# Patient Record
Sex: FEMALE | Race: WHITE | Employment: OTHER | URBAN - METROPOLITAN AREA
[De-identification: names, ages, dates, MRNs, and addresses within clinical notes are randomized per-mention and may not be internally consistent; named-entity substitution may affect disease eponyms.]

---

## 2019-06-10 ENCOUNTER — TRANSFERRED RECORDS (OUTPATIENT)
Dept: HEALTH INFORMATION MANAGEMENT | Facility: CLINIC | Age: 69
End: 2019-06-10

## 2019-11-20 ENCOUNTER — OFFICE VISIT (OUTPATIENT)
Dept: OPHTHALMOLOGY | Facility: CLINIC | Age: 69
End: 2019-11-20
Payer: COMMERCIAL

## 2019-11-20 DIAGNOSIS — H02.834 DERMATOCHALASIS OF BOTH UPPER EYELIDS: Primary | ICD-10-CM

## 2019-11-20 DIAGNOSIS — H57.813 BROW PTOSIS, BILATERAL: ICD-10-CM

## 2019-11-20 DIAGNOSIS — H02.831 DERMATOCHALASIS OF BOTH UPPER EYELIDS: Primary | ICD-10-CM

## 2019-11-20 PROBLEM — Z12.11 SCREEN FOR COLON CANCER: Status: ACTIVE | Noted: 2017-09-15

## 2019-11-20 PROBLEM — K21.9 GERD (GASTROESOPHAGEAL REFLUX DISEASE): Status: ACTIVE | Noted: 2017-09-15

## 2019-11-20 PROCEDURE — 92081 LIMITED VISUAL FIELD XM: CPT | Performed by: OPHTHALMOLOGY

## 2019-11-20 PROCEDURE — 99203 OFFICE O/P NEW LOW 30 MIN: CPT | Performed by: OPHTHALMOLOGY

## 2019-11-20 PROCEDURE — 92285 EXTERNAL OCULAR PHOTOGRAPHY: CPT | Performed by: OPHTHALMOLOGY

## 2019-11-20 RX ORDER — NITROFURANTOIN 25; 75 MG/1; MG/1
CAPSULE ORAL
Refills: 0 | COMMUNITY
Start: 2018-11-27

## 2019-11-20 RX ORDER — TRIAMCINOLONE ACETONIDE 1 MG/G
CREAM TOPICAL
Refills: 1 | COMMUNITY
Start: 2019-05-29

## 2019-11-20 RX ORDER — HYDROCODONE BITARTRATE AND ACETAMINOPHEN 5; 325 MG/1; MG/1
1-2 TABLET ORAL
COMMUNITY
Start: 2012-01-28

## 2019-11-20 RX ORDER — LOSARTAN POTASSIUM 25 MG/1
25 TABLET ORAL
COMMUNITY

## 2019-11-20 RX ORDER — ALENDRONATE SODIUM 35 MG/1
TABLET ORAL
Refills: 3 | COMMUNITY
Start: 2019-08-18

## 2019-11-20 RX ORDER — CHLORAL HYDRATE 500 MG
1000 CAPSULE ORAL
COMMUNITY

## 2019-11-20 RX ORDER — ROSUVASTATIN CALCIUM 10 MG/1
TABLET, COATED ORAL
Refills: 3 | COMMUNITY
Start: 2019-08-24

## 2019-11-20 ASSESSMENT — EXTERNAL EXAM - LEFT EYE: OS_EXAM: BROW PTOSIS, WITH FRONTALIS RELAXED, BROW IS BELOW SUPERIOR ORBITAL RIM AND LATERALLY INLINE WITH UPPER LASHES

## 2019-11-20 ASSESSMENT — TONOMETRY
OD_IOP_MMHG: 12
OS_IOP_MMHG: 14
IOP_METHOD: ICARE

## 2019-11-20 ASSESSMENT — VISUAL ACUITY
OD_SC+: -1
OS_SC: 20/20
OD_SC: 20/20
METHOD: SNELLEN - LINEAR

## 2019-11-20 ASSESSMENT — EXTERNAL EXAM - RIGHT EYE: OD_EXAM: BROW PTOSIS, WITH FRONTALIS RELAXED, BROW IS BELOW SUPERIOR ORBITAL RIM AND LATERALLY INLINE WITH UPPER LASHES

## 2019-11-20 ASSESSMENT — SLIT LAMP EXAM - LIDS
COMMENTS: HEAVY DERMATOCHALASIS RESTING ON LASHES
COMMENTS: HEAVY DERMATOCHALASIS RESTING ON LASHES

## 2019-11-20 NOTE — LETTER
2019         RE:  :  MRN: Rosario Anguiano  1950  [id number]     Dear Dr. Dobbs,    Thank you for asking me to see your patient, Rosario Anguiano, for an oculoplastic   consultation.  My assessment and plan are below.  For further details, please see my attached clinic note.           HPI:     Chief Complaint(s) and History of Present Illness(es)     Droopy Eye Lid Evaluation     Laterality: right upper lid and left upper lid    Severity: moderate    Onset: chronic    Duration: years    Course: gradually worsening          Comments     Patient is referred by Dr. Aries Dobbs for eyelid evaluation. Patient   reports using ATs a few times a week for dry eyes.         Rosario Anguiano is a 68 year old female who has noted gradual onset of droopy eyelids over the past years. The droopy eyelid is interfering with activities of daily living including driving, and reading. The patient denies double vision, variability of the eyelid position, or dry eye symptoms.     EXAM:     MRD1: 2 right eye and 1.5 left eye   Dermatochalasis with excess skin touching eyelashes worse on the left due to brow ptosis  Brow ptosis with brow resting below superior orbital rim     VISUAL FIELD:  Right eye untaped:20 degrees Right eye taped:50 degrees  Left eye untaped:24 degrees Left eye taped:50 degrees    Assessment & Plan     Rosario Anguiano is a 68 year old female with the following diagnoses:   1. Dermatochalasis of both upper eyelids    2. Brow ptosis, bilateral         Both upper eyelid blepharoplasty (N&CF) and Internal browpexy both    ANTICOAGULATION:    Supplements (fish Oil, vitamin E): Fishoil    Can hold prior to surgery        Again, thank you for allowing me to participate in the care of your patient.      Sincerely,    Jeb Harding MD  Department of Ophthalmology and Visual Neurosciences  [facility]    CC: Aries Dobbs  [facility]  [address] [facility] [address]  [facility] [address]  VIA  Facsimile: [phone]

## 2019-11-20 NOTE — NURSING NOTE
Chief Complaints and History of Present Illnesses   Patient presents with     Droopy Eye Lid Evaluation       Chief Complaint(s) and History of Present Illness(es)     Droopy Eye Lid Evaluation     Laterality: right upper lid and left upper lid    Severity: moderate    Onset: chronic    Duration: years    Course: gradually worsening              Comments     Patient is referred by Dr. Aries Dobbs for eyelid evaluation. Patient reports using ATs a few times a week for dry eyes.                Melanie Jeans, OA

## 2019-12-26 ENCOUNTER — ANESTHESIA EVENT (OUTPATIENT)
Dept: SURGERY | Facility: AMBULATORY SURGERY CENTER | Age: 69
End: 2019-12-26

## 2019-12-30 ENCOUNTER — SURGERY (OUTPATIENT)
Age: 69
End: 2019-12-30
Payer: COMMERCIAL

## 2019-12-30 ENCOUNTER — ANESTHESIA (OUTPATIENT)
Dept: SURGERY | Facility: AMBULATORY SURGERY CENTER | Age: 69
End: 2019-12-30
Payer: COMMERCIAL

## 2019-12-30 ENCOUNTER — HOSPITAL ENCOUNTER (OUTPATIENT)
Facility: AMBULATORY SURGERY CENTER | Age: 69
Discharge: HOME | End: 2019-12-30
Attending: OPHTHALMOLOGY | Admitting: OPHTHALMOLOGY
Payer: COMMERCIAL

## 2019-12-30 VITALS
TEMPERATURE: 97.4 F | SYSTOLIC BLOOD PRESSURE: 145 MMHG | OXYGEN SATURATION: 98 % | DIASTOLIC BLOOD PRESSURE: 67 MMHG | RESPIRATION RATE: 16 BRPM

## 2019-12-30 DIAGNOSIS — Z98.890 POSTOPERATIVE EYE STATE: Primary | ICD-10-CM

## 2019-12-30 DIAGNOSIS — H02.834 DERMATOCHALASIS OF BOTH UPPER EYELIDS: ICD-10-CM

## 2019-12-30 DIAGNOSIS — H02.831 DERMATOCHALASIS OF BOTH UPPER EYELIDS: ICD-10-CM

## 2019-12-30 DIAGNOSIS — H57.813 BROW PTOSIS, BILATERAL: ICD-10-CM

## 2019-12-30 PROCEDURE — 15823 BLEPHARP UPR EYELID XCSV SKN: CPT | Mod: 50 | Performed by: OPHTHALMOLOGY

## 2019-12-30 PROCEDURE — G8907 PT DOC NO EVENTS ON DISCHARG: HCPCS

## 2019-12-30 PROCEDURE — G8918 PT W/O PREOP ORDER IV AB PRO: HCPCS

## 2019-12-30 PROCEDURE — 15823 BLEPHARP UPR EYELID XCSV SKN: CPT | Mod: E1

## 2019-12-30 PROCEDURE — 67900 REPAIR BROW DEFECT: CPT | Mod: LT

## 2019-12-30 RX ORDER — SODIUM CHLORIDE, SODIUM LACTATE, POTASSIUM CHLORIDE, CALCIUM CHLORIDE 600; 310; 30; 20 MG/100ML; MG/100ML; MG/100ML; MG/100ML
INJECTION, SOLUTION INTRAVENOUS CONTINUOUS
Status: DISCONTINUED | OUTPATIENT
Start: 2019-12-30 | End: 2019-12-31 | Stop reason: HOSPADM

## 2019-12-30 RX ORDER — ERYTHROMYCIN 5 MG/G
OINTMENT OPHTHALMIC
Qty: 1 TUBE | Refills: 0 | Status: SHIPPED | OUTPATIENT
Start: 2019-12-30

## 2019-12-30 RX ORDER — OXYCODONE HYDROCHLORIDE 5 MG/1
5 TABLET ORAL EVERY 4 HOURS PRN
Status: DISCONTINUED | OUTPATIENT
Start: 2019-12-30 | End: 2019-12-31 | Stop reason: HOSPADM

## 2019-12-30 RX ORDER — MEPERIDINE HYDROCHLORIDE 25 MG/ML
12.5 INJECTION INTRAMUSCULAR; INTRAVENOUS; SUBCUTANEOUS
Status: DISCONTINUED | OUTPATIENT
Start: 2019-12-30 | End: 2019-12-31 | Stop reason: HOSPADM

## 2019-12-30 RX ORDER — NALOXONE HYDROCHLORIDE 0.4 MG/ML
.1-.4 INJECTION, SOLUTION INTRAMUSCULAR; INTRAVENOUS; SUBCUTANEOUS
Status: DISCONTINUED | OUTPATIENT
Start: 2019-12-30 | End: 2019-12-31 | Stop reason: HOSPADM

## 2019-12-30 RX ORDER — HYDROMORPHONE HYDROCHLORIDE 1 MG/ML
.3-.5 INJECTION, SOLUTION INTRAMUSCULAR; INTRAVENOUS; SUBCUTANEOUS EVERY 10 MIN PRN
Status: DISCONTINUED | OUTPATIENT
Start: 2019-12-30 | End: 2019-12-31 | Stop reason: HOSPADM

## 2019-12-30 RX ORDER — ONDANSETRON 2 MG/ML
4 INJECTION INTRAMUSCULAR; INTRAVENOUS EVERY 30 MIN PRN
Status: DISCONTINUED | OUTPATIENT
Start: 2019-12-30 | End: 2019-12-31 | Stop reason: HOSPADM

## 2019-12-30 RX ORDER — PROPOFOL 10 MG/ML
INJECTION, EMULSION INTRAVENOUS PRN
Status: DISCONTINUED | OUTPATIENT
Start: 2019-12-30 | End: 2019-12-30

## 2019-12-30 RX ORDER — LIDOCAINE 40 MG/G
CREAM TOPICAL
Status: DISCONTINUED | OUTPATIENT
Start: 2019-12-30 | End: 2019-12-31 | Stop reason: HOSPADM

## 2019-12-30 RX ORDER — ACETAMINOPHEN 325 MG/1
975 TABLET ORAL ONCE
Status: COMPLETED | OUTPATIENT
Start: 2019-12-30 | End: 2019-12-30

## 2019-12-30 RX ORDER — LIDOCAINE HYDROCHLORIDE 20 MG/ML
INJECTION, SOLUTION INFILTRATION; PERINEURAL PRN
Status: DISCONTINUED | OUTPATIENT
Start: 2019-12-30 | End: 2019-12-30

## 2019-12-30 RX ORDER — TETRACAINE HYDROCHLORIDE 5 MG/ML
SOLUTION OPHTHALMIC PRN
Status: DISCONTINUED | OUTPATIENT
Start: 2019-12-30 | End: 2019-12-30 | Stop reason: HOSPADM

## 2019-12-30 RX ORDER — FENTANYL CITRATE 50 UG/ML
25-50 INJECTION, SOLUTION INTRAMUSCULAR; INTRAVENOUS
Status: DISCONTINUED | OUTPATIENT
Start: 2019-12-30 | End: 2019-12-31 | Stop reason: HOSPADM

## 2019-12-30 RX ORDER — ERYTHROMYCIN 5 MG/G
OINTMENT OPHTHALMIC PRN
Status: DISCONTINUED | OUTPATIENT
Start: 2019-12-30 | End: 2019-12-30 | Stop reason: HOSPADM

## 2019-12-30 RX ORDER — FENTANYL CITRATE 50 UG/ML
INJECTION, SOLUTION INTRAMUSCULAR; INTRAVENOUS PRN
Status: DISCONTINUED | OUTPATIENT
Start: 2019-12-30 | End: 2019-12-30

## 2019-12-30 RX ORDER — ONDANSETRON 4 MG/1
4 TABLET, ORALLY DISINTEGRATING ORAL EVERY 30 MIN PRN
Status: DISCONTINUED | OUTPATIENT
Start: 2019-12-30 | End: 2019-12-31 | Stop reason: HOSPADM

## 2019-12-30 RX ADMIN — FENTANYL CITRATE 25 MCG: 50 INJECTION, SOLUTION INTRAMUSCULAR; INTRAVENOUS at 13:26

## 2019-12-30 RX ADMIN — SODIUM CHLORIDE, SODIUM LACTATE, POTASSIUM CHLORIDE, CALCIUM CHLORIDE: 600; 310; 30; 20 INJECTION, SOLUTION INTRAVENOUS at 11:31

## 2019-12-30 RX ADMIN — ERYTHROMYCIN 1 INCH: 5 OINTMENT OPHTHALMIC at 13:41

## 2019-12-30 RX ADMIN — PROPOFOL 10 MG: 10 INJECTION, EMULSION INTRAVENOUS at 13:31

## 2019-12-30 RX ADMIN — TETRACAINE HYDROCHLORIDE 1 DROP: 5 SOLUTION OPHTHALMIC at 13:26

## 2019-12-30 RX ADMIN — ACETAMINOPHEN 975 MG: 325 TABLET ORAL at 11:24

## 2019-12-30 RX ADMIN — PROPOFOL 100 MG: 10 INJECTION, EMULSION INTRAVENOUS at 13:30

## 2019-12-30 RX ADMIN — LIDOCAINE HYDROCHLORIDE 60 MG: 20 INJECTION, SOLUTION INFILTRATION; PERINEURAL at 13:27

## 2019-12-30 NOTE — ANESTHESIA PREPROCEDURE EVALUATION
Anesthesia Pre-Procedure Evaluation    Patient: Rosario Anguiano   MRN:     [id number] Gender:   female   Age:    69 year old :      1950        Preoperative Diagnosis: Dermatochalasis of both upper eyelids [H02.831, H02.834]  Brow ptosis, bilateral [H57.813]   Procedure(s):  Both upper eyelid blepharoplasty  internal browpexy     Past Medical History:   Diagnosis Date     Hypertension      PONV (postoperative nausea and vomiting)       Past Surgical History:   Procedure Laterality Date     CATARACT IOL, RT/LT Bilateral Spring 2019          Anesthesia Evaluation     . Pt has had prior anesthetic.     History of anesthetic complications   - PONV        ROS/MED HX    ENT/Pulmonary:  - neg pulmonary ROS     Neurologic:  - neg neurologic ROS     Cardiovascular:  - neg cardiovascular ROS   (+) hypertension----. : . . . :. .       METS/Exercise Tolerance:     Hematologic:  - neg hematologic  ROS       Musculoskeletal:  - neg musculoskeletal ROS       GI/Hepatic:  - neg GI/hepatic ROS   (+) GERD       Renal/Genitourinary:  - ROS Renal section negative       Endo:  - neg endo ROS       Psychiatric:  - neg psychiatric ROS       Infectious Disease:  - neg infectious disease ROS       Malignancy:      - no malignancy   Other:    - neg other ROS                     PHYSICAL EXAM:   Mental Status/Neuro: A/A/O   Airway: Facies: Feasible  Mallampati: I  Mouth/Opening: Full  TM distance: > 6 cm  Neck ROM: Full   Respiratory: Auscultation: CTAB     Resp. Rate: Normal     Resp. Effort: Normal      CV: Rhythm: Regular  Rate: Age appropriate  Heart: Normal Sounds  Edema: None   Comments:      Dental: Normal Dentition                LABS:  CBC: No results found for: WBC, HGB, HCT, PLT  BMP: No results found for: NA, POTASSIUM, CHLORIDE, CO2, BUN, CR, GLC  COAGS: No results found for: PTT, INR, FIBR  POC: No results found for: BGM, HCG, HCGS  OTHER: No results found for: PH, LACT, A1C, BARRY, PHOS, MAG, ALBUMIN, PROTTOTAL, ALT,  AST, GGT, ALKPHOS, BILITOTAL, BILIDIRECT, LIPASE, AMYLASE, SUKI, TSH, T4, T3, CRP, SED     Preop Vitals    BP Readings from Last 3 Encounters:   12/30/19 (!) 162/75    Pulse Readings from Last 3 Encounters:   No data found for Pulse      Resp Readings from Last 3 Encounters:   12/30/19 16    SpO2 Readings from Last 3 Encounters:   12/30/19 98%      Temp Readings from Last 1 Encounters:   12/30/19 36.7  C (98  F) (Temporal)    Ht Readings from Last 1 Encounters:   No data found for Ht      Wt Readings from Last 1 Encounters:   No data found for Wt    There is no height or weight on file to calculate BMI.     LDA:  Peripheral IV 12/30/19 Right Hand (Active)   Site Assessment WDL 12/30/2019 11:25 AM   Line Status Infusing 12/30/2019 11:25 AM   Phlebitis Scale 0-->no symptoms 12/30/2019 11:25 AM   Infiltration Scale 0 12/30/2019 11:25 AM   Infiltration Site Treatment Method  None 12/30/2019 11:25 AM   Extravasation? No 12/30/2019 11:25 AM   Dressing Intervention New dressing  12/30/2019 11:25 AM   Number of days: 0        Assessment:   ASA SCORE: 2    H&P: History and physical reviewed and following examination; no interval change.   Smoking Status:  Non-Smoker/Unknown   NPO Status: NPO Appropriate     Plan:   Anes. Type:  MAC   Pre-Medication: None   Induction:  N/a   Airway: Native Airway   Access/Monitoring: PIV   Maintenance: Propofol Sedation     Postop Plan:   Postop Pain: None  Postop Sedation/Airway: Not planned     PONV Management:   Adult Risk Factors: Female, H/o PONV or Motion Sickness, Non-Smoker   Prevention: Ondansetron, Propofol     CONSENT: Direct conversation   Plan and risks discussed with: Patient   Blood Products: Consent Deferred (Minimal Blood Loss)                   Shane Billings MD

## 2019-12-30 NOTE — DISCHARGE INSTRUCTIONS
Post-operative Instructions  Ophthalmic Plastic and Reconstructive Surgery    Jeb Harding M.D.     All instructions apply to the operated eye(s) or eyelid(s).    Wound care and personal care  ? Apply ice compresses 15 minutes of every hour while awake for 2 days. As long as there is no further bleeding, after two days, switch to warm water compresses for five minutes, four times a day until seen by your physician. Instead of warm water compresses, you can use a cup of dry uncooked rice in a clean cotton sock. Place sock in microwave for 30 seconds to one minute. Next place the warm sock in a plastic bag, and place it over a clean warm wet washcloth over operated eye.    ? You may shower or wash your hair the day after surgery. Do not go swimming for at least 2 weeks to prevent contamination of your wounds.  ? Do not apply make-up to the eyes or eyelids for 2 weeks after surgery.  ? Expect some swelling, bruising, black eye (even into the lower eyelids and cheeks). Also expect serum caking, crusting and discharge from the eye and/or incisions. A small amount of surface bleeding, and depending on the type of surgery, bleeding from the inside of the eyelid, is normal for the first 48 hours.  ? Avoid straining, bending at the waist, or lifting more than 15 pounds for 10 days. Activities that raise your blood pressure can worsen swelling, cause bleeding, and breaking of sutures. Like wise, sleeping with your head slightly elevated for the first several days can help swelling resolve more quickly.   ? Do continue to ambulate (walk) as you normally would - being sedentary after surgery can cause blood clots.   ? Your eye(s) and eyelid(s) may be painful and tender. This is normal after surgery.      Contact information and follow-up  ? Return to the Eye Clinic for a follow-up appointment with your physician as scheduled. If no appointment has been scheduled:   - [facility] eye clinic: [phone] for an  appointment with Dr. Harding within 1 to 2 weeks from your date of surgery.   -  [facility] eye clinic: [phone] for an appointment with Dr. Harding within 1 to 2 weeks from your date of surgery.     ? For severe pain, bleeding, or loss of vision, call the [facility] at [phone] or [facility] at [phone].     After hours or on weekends and holidays, call [phone] and ask to speak with the ophthalmologist on call.    An on call person can be reached after hours for concerns. The on call doctor should not call in medication refill requests after hours or on weekends, so please plan accordingly. An effort has been made to provide adequate pain medications following every surgery, and refills will not be provided in most instances. Narcotic pain medications cannot be called in.     Activity restrictions and driving  ? Avoid heavy lifting, bending, exercise or strenuous activity for 1 week after surgery.  You may resume other activities and return to work as tolerated.  ? You may not resume driving if you are using narcotic pain medications (such as Norco, Percocet, Tylenol #3).    Medications  ? Restart all regular home medications and eye drops. If you take Plavix or  Aspirin on a regular basis, wait for 72 hours after your surgery before restarting these in order to decrease the risk of bleeding complications.  ? Avoid aspirin and aspirin-like medications (Motrin, Aleve, Ibuprofen, Diann-Kingman etc) for 72 hours to reduce the risk of bleeding. You may take Tylenol (acetaminophen) for pain.  ? In addition to your home medications, take the following post-operative medications as prescribed by your physician.    ? Apply antibiotic ointment to all sutures three times a day  ? In many cases, postoperative discomfort can be managed with Tylenol alone. If narcotic pain medication was prescribed, take pain pills at prescribed frequency as needed for  pain.    ? WARNING: Most prescription pain medications contain Tylenol  (acetaminophen). You must not take more than 4,000 mg of acetaminophen per  24-hour period. This is equivalent to 6 tablets of Darvocet, 12 tablets of Norco, Percocet or Tylenol #3. If you take other over-the-counter medications containing acetaminophen, you must take the amount of acetaminophen into account and reduce the number of prescribed pain pills accordingly.  ? Prescribed narcotic medications may make you drowsy. You must not drive a car, operate heavy machinery or drink alcohol while taking them.  ? Prescribed narcotic pain medications may cause constipation and nausea. Take them with some food to prevent a stomach upset.    [facility]  Same-Day Surgery   Adult Discharge Orders & Instructions   For 24 hours after surgery  1. Get plenty of rest.  A responsible adult must stay with you for at least 24 hours after you leave the hospital.   2. Do not drive or use heavy equipment.  If you have weakness or tingling, don't drive or use heavy equipment until this feeling goes away.  3. Do not drink alcohol.  4. Avoid strenuous or risky activities.  Ask for help when climbing stairs.   5. You may feel lightheaded.  IF so, sit for a few minutes before standing.  Have someone help you get up.   6. If you have nausea (feel sick to your stomach): Drink only clear liquids such as apple juice, ginger ale, broth or 7-Up.  Rest may also help.  Be sure to drink enough fluids.  Move to a regular diet as you feel able.  7. You may have a slight fever. Call the doctor if your fever is over 100 F (37.7 C) (taken under the tongue) or lasts longer than 24 hours.  8. You may have a dry mouth, a sore throat, muscle aches or trouble sleeping.  These should go away after 24 hours.  9. Do not make important or legal decisions.   Call your doctor for any of the followin.  Signs of infection (fever, growing tenderness at the surgery  site, a large amount of drainage or bleeding, severe pain, foul-smelling drainage, redness, swelling).    2. It has been over 8 to 10 hours since surgery and you are still not able to urinate (pass water).    3.  Headache for over 24 hours.    4.  Numbness, tingling or weakness the day after surgery (if you had spinal anesthesia).  To contact Dr Harding call:  [phone] - Day  [phone] - After Hours, ask for the Opthomologist on call

## 2019-12-30 NOTE — ANESTHESIA POSTPROCEDURE EVALUATION
Anesthesia POST Procedure Evaluation    Patient: Rosario Anguiano   MRN:     [id number] Gender:   female   Age:    69 year old :      1950        Preoperative Diagnosis: Dermatochalasis of both upper eyelids [H02.831, H02.834]  Brow ptosis, bilateral [H57.813]   Procedure(s):  Both upper eyelid blepharoplasty  internal browpexy   Postop Comments: No value filed.       Anesthesia Type:  Not documented  MAC    Reportable Event: NO     PAIN: Uncomplicated   Sign Out status: Comfortable, Well controlled pain     PONV: No PONV   Sign Out status:  No Nausea or Vomiting     Neuro/Psych: Uneventful perioperative course   Sign Out Status: Preoperative baseline; Age appropriate mentation     Airway/Resp.: Uneventful perioperative course   Sign Out Status: Non labored breathing, age appropriate RR; Resp. Status within EXPECTED Parameters     CV: Uneventful perioperative course   Sign Out status: Appropriate BP and perfusion indices; Appropriate HR/Rhythm     Disposition:   Sign Out in:  PACU  Disposition:  Phase II; Home  Recovery Course: Uneventful  Follow-Up: Not required           Last Anesthesia Record Vitals:  CRNA VITALS  2019 1333 - 2019 1433      2019             Pulse:  67    SpO2:  97 %          Last PACU Vitals:  Vitals Value Taken Time   BP     Temp     Pulse     Resp     SpO2     Temp src Skin 2019  2:00 PM   NIBP 129/69 2019  2:01 PM   Pulse 67 2019  2:03 PM   SpO2 97 % 2019  2:03 PM   Resp     Temp 36  C (96.8  F) 2019  2:00 PM   Ht Rate     Temp 2           Electronically Signed By: Shane Billings MD, 2019, 3:34 PM

## 2019-12-30 NOTE — ANESTHESIA CARE TRANSFER NOTE
Patient: Rosario Anguiano    Procedure(s):  Both upper eyelid blepharoplasty  internal browpexy    Diagnosis: Dermatochalasis of both upper eyelids [H02.831, H02.834]  Brow ptosis, bilateral [H57.813]  Diagnosis Additional Information: No value filed.    Anesthesia Type:   MAC     Note:  Airway :Room Air  Patient transferred to:Phase II  Comments: To Phase II. Report to RN.  VSS Resp status stable.Handoff Report: Identifed the Patient, Identified the Reponsible Provider, Reviewed the pertinent medical history, Discussed the surgical course, Reviewed Intra-OP anesthesia mangement and issues during anesthesia, Set expectations for post-procedure period and Allowed opportunity for questions and acknowledgement of understanding      Vitals: (Last set prior to Anesthesia Care Transfer)    CRNA VITALS  12/30/2019 1333 - 12/30/2019 1406      12/30/2019             Pulse:  67    SpO2:  97 %                Electronically Signed By: CHIQUITA Fonseca CRNA  December 30, 2019  2:06 PM

## 2019-12-30 NOTE — OP NOTE
PREOPERATIVE DIAGNOSES:   1. Bilateral upper eyelid dermatochalasis.   2. Bilateral brow ptosis    POSTOPERATIVE DIAGNOSES:   1. Bilateral upper eyelid dermatochalasis.   2. Bilateral brow ptosis    PROCEDURE PERFORMED:   1. Bilateral upper eyelid blepharoplasty.   2. Bilateral brow ptosis repair.     SURGEON: Jeb Harding MD, MD    ASSISTANT: Carey Orozco MD    ANESTHESIA: Monitored with local infiltration, 50/50 mixture of 2% lidocaine with epinephrine and 0.5% Marcaine.     COMPLICATIONS: None.     ESTIMATED BLOOD LOSS: Less than 5 mL.     HISTORY: Rosario Anguiano  presented with upper lid drooping interfering with superior visual field and activities of daily living. After the risks, benefits and alternatives to the proposed procedure were explained, informed consent was obtained.     DESCRIPTION OF PROCEDURE: Rosario Anguiano  was brought to the operating room and placed supine on the operating table. IV sedation was given. The upper lid crease and excess upper eyelid skin was marked on each upper eyelid, and the eyelids infiltrated with local anesthetic. The area was prepped and draped in the typical sterile fashion for oculoplastic surgery. Attention was directed to the right side. Skin was incised following marked lines. Skin and orbicularis muscle flap were excised with high temperature cautery. Orbital septum was opened horizontally. The nasal and central fat pads were debulked with the high temperature cautery. Again, hemostasis was obtained.Dissection was then carried into the suborbicularis plane superiorly over the orbital rim.  A 5-0 Monocryl suture was passed through the brow at the marking made at the inferior brow hairs.  This suture was then passed through the frontal periosteum 1 cm above the orbital rim nicely securing the tail of the brow in an improved position.  It was then passed through the orbicularis in line with the marking stitch. The suture was pulled through and tied in a  permanent fashion. The  upper eyelid was closed with running 6-0 plain gut suture.  Attention was directed to the left side where the same procedure was performed. Antibiotic ointment was applied. The patient tolerated the procedure well. He was taken to the recovery room in stable condition.    Jeb Harding MD

## 2020-01-15 ENCOUNTER — OFFICE VISIT (OUTPATIENT)
Dept: OPHTHALMOLOGY | Facility: CLINIC | Age: 70
End: 2020-01-15
Payer: COMMERCIAL

## 2020-01-15 DIAGNOSIS — H02.831 DERMATOCHALASIS OF BOTH UPPER EYELIDS: Primary | ICD-10-CM

## 2020-01-15 DIAGNOSIS — H02.834 DERMATOCHALASIS OF BOTH UPPER EYELIDS: Primary | ICD-10-CM

## 2020-01-15 PROCEDURE — 99024 POSTOP FOLLOW-UP VISIT: CPT | Performed by: OPHTHALMOLOGY

## 2020-01-15 ASSESSMENT — VISUAL ACUITY
OD_SC+: -3
OD_SC: 20/20
OS_SC: 20/25
METHOD: SNELLEN - LINEAR

## 2020-01-15 ASSESSMENT — CONF VISUAL FIELD
METHOD: COUNTING FINGERS
OS_NORMAL: 1
OD_NORMAL: 1

## 2020-01-15 NOTE — PROGRESS NOTES
Chief Complaint(s) and History of Present Illness(es)     Post Op (Ophthalmology) Both Eyes     Laterality: both eyes    Associated symptoms: Negative for redness, tearing and eye pain           Comments     2 week post op of bilateral upper eyelid blepharoplasty and bilateral   brow ptosis repair 12/30/2019.  Patient says she can feel stitches on upper lids.  Denies redness, tearing, eye pain either eye.  Eye meds: none  Clarence Menchacaflor, [place] 1/15/2020 2:36 PM      Very happy with improvement in peripheral vision and pleased with aesthetic improvement.           Rosario Anguiano is status post bilateral upper eyelid blepharoplasty  and bilateral internal browpexy.  Incision(s) healing well.  The lid(s)  are  in excellent position.    I have recommended:  * Continue antibiotic ointment or bland lubricating ointment (eg vaseline or aquaphor) to the incision site at bedtime.   * Can massage along the incision 2-3 x daily.   * Warm soaks 3-4x daily until all edema and ecchymoses resolve  * Return to clinic prn    Attending Physician Attestation:  Complete documentation of historical and exam elements from today's encounter can be found in the full encounter summary report (not reduplicated in this progress note).  I personally obtained the chief complaint(s) and history of present illness.  I confirmed and edited as necessary the review of systems, past medical/surgical history, family history, social history, and examination findings as documented by others; and I examined the patient myself.  I personally reviewed the relevant tests, images, and reports as documented above.  I formulated and edited as necessary the assessment and plan and discussed the findings and management plan with the patient and family. - Jeb Harding MD

## 2020-01-15 NOTE — NURSING NOTE
Chief Complaints and History of Present Illnesses   Patient presents with     Post Op (Ophthalmology) Both Eyes      Chief Complaint(s) and History of Present Illness(es)     Post Op (Ophthalmology) Both Eyes     Laterality: both eyes    Associated symptoms: Negative for redness, tearing and eye pain              Comments     2 week post op of bilateral upper eyelid blepharoplasty and bilateral brow ptosis repair 12/30/2019.  Patient says she can feel stitches on upper lids.  Denies redness, tearing, eye pain either eye.  Eye meds: none  AZAR Vazquez 1/15/2020 2:36 PM

## 2020-03-02 ENCOUNTER — HEALTH MAINTENANCE LETTER (OUTPATIENT)
Age: 70
End: 2020-03-02

## 2020-12-20 ENCOUNTER — HEALTH MAINTENANCE LETTER (OUTPATIENT)
Age: 70
End: 2020-12-20

## 2021-04-07 ENCOUNTER — APPOINTMENT (OUTPATIENT)
Dept: URBAN - METROPOLITAN AREA CLINIC 259 | Age: 71
Setting detail: DERMATOLOGY
End: 2021-04-07

## 2021-04-07 DIAGNOSIS — D17 BENIGN LIPOMATOUS NEOPLASM: ICD-10-CM

## 2021-04-07 DIAGNOSIS — Z71.89 OTHER SPECIFIED COUNSELING: ICD-10-CM

## 2021-04-07 DIAGNOSIS — D22 MELANOCYTIC NEVI: ICD-10-CM

## 2021-04-07 DIAGNOSIS — D18.0 HEMANGIOMA: ICD-10-CM

## 2021-04-07 DIAGNOSIS — L81.4 OTHER MELANIN HYPERPIGMENTATION: ICD-10-CM

## 2021-04-07 DIAGNOSIS — L82.1 OTHER SEBORRHEIC KERATOSIS: ICD-10-CM

## 2021-04-07 DIAGNOSIS — L57.0 ACTINIC KERATOSIS: ICD-10-CM

## 2021-04-07 PROBLEM — D18.01 HEMANGIOMA OF SKIN AND SUBCUTANEOUS TISSUE: Status: ACTIVE | Noted: 2021-04-07

## 2021-04-07 PROBLEM — D17.0 BENIGN LIPOMATOUS NEOPLASM OF SKIN AND SUBCUTANEOUS TISSUE OF HEAD, FACE AND NECK: Status: ACTIVE | Noted: 2021-04-07

## 2021-04-07 PROBLEM — D22.5 MELANOCYTIC NEVI OF TRUNK: Status: ACTIVE | Noted: 2021-04-07

## 2021-04-07 PROCEDURE — 17003 DESTRUCT PREMALG LES 2-14: CPT

## 2021-04-07 PROCEDURE — OTHER LIQUID NITROGEN: OTHER

## 2021-04-07 PROCEDURE — 17000 DESTRUCT PREMALG LESION: CPT

## 2021-04-07 PROCEDURE — OTHER COUNSELING: OTHER

## 2021-04-07 PROCEDURE — 99203 OFFICE O/P NEW LOW 30 MIN: CPT | Mod: 25

## 2021-04-07 PROCEDURE — OTHER MIPS QUALITY: OTHER

## 2021-04-07 ASSESSMENT — LOCATION DETAILED DESCRIPTION DERM
LOCATION DETAILED: RIGHT PROXIMAL PRETIBIAL REGION
LOCATION DETAILED: LEFT CENTRAL MALAR CHEEK
LOCATION DETAILED: INFERIOR THORACIC SPINE
LOCATION DETAILED: LEFT CLAVICULAR NECK

## 2021-04-07 ASSESSMENT — LOCATION ZONE DERM
LOCATION ZONE: LEG
LOCATION ZONE: FACE
LOCATION ZONE: NECK
LOCATION ZONE: TRUNK

## 2021-04-07 ASSESSMENT — LOCATION SIMPLE DESCRIPTION DERM
LOCATION SIMPLE: RIGHT PRETIBIAL REGION
LOCATION SIMPLE: LEFT ANTERIOR NECK
LOCATION SIMPLE: LEFT CHEEK
LOCATION SIMPLE: UPPER BACK

## 2021-04-07 NOTE — PROCEDURE: LIQUID NITROGEN
Render Post-Care Instructions In Note?: yes
Consent: The patient's consent was obtained including but not limited to risks of crusting, scabbing, blistering, scarring, darker or lighter pigmentary change, recurrence, incomplete removal and infection.
Total Number Of Aks Treated: 3
Duration Of Freeze Thaw-Cycle (Seconds): 0
Detail Level: Zone
Post-Care Instructions: I reviewed with the patient in detail post-care instructions. Patient is to wear sunprotection, and avoid picking at any of the treated lesions. Pt may apply Vaseline to crusted or scabbing areas.
Render In Bullet Format When Appropriate: No

## 2021-04-18 ENCOUNTER — HEALTH MAINTENANCE LETTER (OUTPATIENT)
Age: 71
End: 2021-04-18

## 2021-10-03 ENCOUNTER — HEALTH MAINTENANCE LETTER (OUTPATIENT)
Age: 71
End: 2021-10-03

## 2022-03-20 ENCOUNTER — HEALTH MAINTENANCE LETTER (OUTPATIENT)
Age: 72
End: 2022-03-20

## 2022-05-15 ENCOUNTER — HEALTH MAINTENANCE LETTER (OUTPATIENT)
Age: 72
End: 2022-05-15

## 2022-09-04 ENCOUNTER — HEALTH MAINTENANCE LETTER (OUTPATIENT)
Age: 72
End: 2022-09-04

## 2023-04-04 ENCOUNTER — APPOINTMENT (OUTPATIENT)
Dept: URBAN - METROPOLITAN AREA CLINIC 259 | Age: 73
Setting detail: DERMATOLOGY
End: 2023-04-05

## 2023-04-04 DIAGNOSIS — L82.1 OTHER SEBORRHEIC KERATOSIS: ICD-10-CM

## 2023-04-04 DIAGNOSIS — L57.0 ACTINIC KERATOSIS: ICD-10-CM

## 2023-04-04 DIAGNOSIS — Z71.89 OTHER SPECIFIED COUNSELING: ICD-10-CM

## 2023-04-04 DIAGNOSIS — D18.0 HEMANGIOMA: ICD-10-CM

## 2023-04-04 DIAGNOSIS — D22 MELANOCYTIC NEVI: ICD-10-CM

## 2023-04-04 DIAGNOSIS — L81.4 OTHER MELANIN HYPERPIGMENTATION: ICD-10-CM

## 2023-04-04 DIAGNOSIS — L57.8 OTHER SKIN CHANGES DUE TO CHRONIC EXPOSURE TO NONIONIZING RADIATION: ICD-10-CM

## 2023-04-04 PROBLEM — D22.5 MELANOCYTIC NEVI OF TRUNK: Status: ACTIVE | Noted: 2023-04-04

## 2023-04-04 PROBLEM — D18.01 HEMANGIOMA OF SKIN AND SUBCUTANEOUS TISSUE: Status: ACTIVE | Noted: 2023-04-04

## 2023-04-04 PROCEDURE — 17003 DESTRUCT PREMALG LES 2-14: CPT

## 2023-04-04 PROCEDURE — OTHER MIPS QUALITY: OTHER

## 2023-04-04 PROCEDURE — OTHER COUNSELING: OTHER

## 2023-04-04 PROCEDURE — OTHER LIQUID NITROGEN: OTHER

## 2023-04-04 PROCEDURE — 99213 OFFICE O/P EST LOW 20 MIN: CPT | Mod: 25

## 2023-04-04 PROCEDURE — 17000 DESTRUCT PREMALG LESION: CPT

## 2023-04-04 ASSESSMENT — LOCATION SIMPLE DESCRIPTION DERM
LOCATION SIMPLE: LEFT UPPER BACK
LOCATION SIMPLE: RIGHT UPPER BACK
LOCATION SIMPLE: LEFT CHEEK
LOCATION SIMPLE: RIGHT HAND

## 2023-04-04 ASSESSMENT — LOCATION DETAILED DESCRIPTION DERM
LOCATION DETAILED: RIGHT SUPERIOR MEDIAL UPPER BACK
LOCATION DETAILED: LEFT INFERIOR MEDIAL UPPER BACK
LOCATION DETAILED: LEFT MID-UPPER BACK
LOCATION DETAILED: LEFT CENTRAL MALAR CHEEK
LOCATION DETAILED: RIGHT RADIAL DORSAL HAND
LOCATION DETAILED: LEFT MEDIAL UPPER BACK

## 2023-04-04 ASSESSMENT — LOCATION ZONE DERM
LOCATION ZONE: TRUNK
LOCATION ZONE: FACE
LOCATION ZONE: HAND

## 2023-04-04 NOTE — PROCEDURE: LIQUID NITROGEN
Render Post-Care Instructions In Note?: no
Detail Level: Zone
Duration Of Freeze Thaw-Cycle (Seconds): 5
Total Number Of Aks Treated: 2
Post-Care Instructions: I reviewed with the patient in detail post-care instructions. Patient is to wear sunprotection, and avoid picking at any of the treated lesions. Pt may apply Vaseline to crusted or scabbing areas.
Number Of Freeze-Thaw Cycles: 1 freeze-thaw cycle
Consent: The patient's consent was obtained including but not limited to risks of crusting, scabbing, blistering, scarring, darker or lighter pigmentary change, recurrence, incomplete removal and infection.

## 2023-06-03 ENCOUNTER — HEALTH MAINTENANCE LETTER (OUTPATIENT)
Age: 73
End: 2023-06-03

## 2024-09-09 ENCOUNTER — APPOINTMENT (OUTPATIENT)
Dept: URBAN - METROPOLITAN AREA CLINIC 259 | Age: 74
Setting detail: DERMATOLOGY
End: 2024-09-10

## 2024-09-09 DIAGNOSIS — L81.4 OTHER MELANIN HYPERPIGMENTATION: ICD-10-CM

## 2024-09-09 DIAGNOSIS — D22 MELANOCYTIC NEVI: ICD-10-CM

## 2024-09-09 DIAGNOSIS — D18.0 HEMANGIOMA: ICD-10-CM

## 2024-09-09 DIAGNOSIS — L57.0 ACTINIC KERATOSIS: ICD-10-CM

## 2024-09-09 DIAGNOSIS — L57.8 OTHER SKIN CHANGES DUE TO CHRONIC EXPOSURE TO NONIONIZING RADIATION: ICD-10-CM

## 2024-09-09 DIAGNOSIS — L82.1 OTHER SEBORRHEIC KERATOSIS: ICD-10-CM

## 2024-09-09 DIAGNOSIS — Z71.89 OTHER SPECIFIED COUNSELING: ICD-10-CM

## 2024-09-09 PROBLEM — D22.5 MELANOCYTIC NEVI OF TRUNK: Status: ACTIVE | Noted: 2024-09-09

## 2024-09-09 PROBLEM — D18.01 HEMANGIOMA OF SKIN AND SUBCUTANEOUS TISSUE: Status: ACTIVE | Noted: 2024-09-09

## 2024-09-09 PROCEDURE — OTHER COUNSELING: OTHER

## 2024-09-09 PROCEDURE — OTHER MIPS QUALITY: OTHER

## 2024-09-09 PROCEDURE — OTHER LIQUID NITROGEN: OTHER

## 2024-09-09 PROCEDURE — 99213 OFFICE O/P EST LOW 20 MIN: CPT | Mod: 25

## 2024-09-09 PROCEDURE — 17003 DESTRUCT PREMALG LES 2-14: CPT

## 2024-09-09 PROCEDURE — 17000 DESTRUCT PREMALG LESION: CPT

## 2024-09-09 ASSESSMENT — LOCATION ZONE DERM
LOCATION ZONE: LEG
LOCATION ZONE: FACE
LOCATION ZONE: TRUNK

## 2024-09-09 ASSESSMENT — LOCATION SIMPLE DESCRIPTION DERM
LOCATION SIMPLE: LEFT PRETIBIAL REGION
LOCATION SIMPLE: UPPER BACK
LOCATION SIMPLE: RIGHT UPPER BACK
LOCATION SIMPLE: LEFT FOREHEAD
LOCATION SIMPLE: RIGHT ZYGOMA
LOCATION SIMPLE: RIGHT CHEEK
LOCATION SIMPLE: LEFT UPPER BACK
LOCATION SIMPLE: LEFT CHEEK

## 2024-09-09 ASSESSMENT — LOCATION DETAILED DESCRIPTION DERM
LOCATION DETAILED: LEFT DISTAL PRETIBIAL REGION
LOCATION DETAILED: RIGHT SUPERIOR UPPER BACK
LOCATION DETAILED: INFERIOR THORACIC SPINE
LOCATION DETAILED: LEFT SUPERIOR UPPER BACK
LOCATION DETAILED: LEFT INFERIOR FOREHEAD
LOCATION DETAILED: RIGHT CENTRAL ZYGOMA
LOCATION DETAILED: LEFT LATERAL UPPER BACK
LOCATION DETAILED: LEFT MID-UPPER BACK
LOCATION DETAILED: LEFT MEDIAL MALAR CHEEK
LOCATION DETAILED: RIGHT SUPERIOR MEDIAL UPPER BACK
LOCATION DETAILED: RIGHT INFERIOR CENTRAL MALAR CHEEK

## 2024-09-09 NOTE — PROCEDURE: LIQUID NITROGEN
Post-Care Instructions: I reviewed with the patient in detail post-care instructions. Patient is to wear sunprotection, and avoid picking at any of the treated lesions. Pt may apply Vaseline to crusted or scabbing areas.
Show Applicator Variable?: Yes
Render Note In Bullet Format When Appropriate: No
Detail Level: Detailed
Consent: The patient's consent was obtained including but not limited to risks of crusting, scabbing, blistering, scarring, darker or lighter pigmentary change, recurrence, incomplete removal and infection.
Duration Of Freeze Thaw-Cycle (Seconds): 3
Number Of Freeze-Thaw Cycles: 3 freeze-thaw cycles

## (undated) DEVICE — SOL WATER IRRIG 1000ML BOTTLE 07139-09

## (undated) DEVICE — NDL 30GA 1" 305128

## (undated) DEVICE — MARKER SKIN DOUBLE TIP W/FLEXI-RULER W/LABELS

## (undated) DEVICE — ESU EYE HIGH TEMP 65410-183

## (undated) DEVICE — PREP CHLORAPREP 26ML TINTED ORANGE  260815

## (undated) DEVICE — SYR 03ML LL W/O NDL

## (undated) DEVICE — NDL 30GA 0.5" 305106

## (undated) DEVICE — ESU PENCIL W/HOLSTER

## (undated) DEVICE — PACK MINOR EYE

## (undated) DEVICE — GLOVE PROTEXIS W/NEU-THERA 7.5  2D73TE75

## (undated) DEVICE — ESU ELEC NDL 1" COATED/INSULATED E1465

## (undated) RX ORDER — FENTANYL CITRATE 50 UG/ML
INJECTION, SOLUTION INTRAMUSCULAR; INTRAVENOUS
Status: DISPENSED
Start: 2019-12-30

## (undated) RX ORDER — ACETAMINOPHEN 325 MG/1
TABLET ORAL
Status: DISPENSED
Start: 2019-12-30